# Patient Record
Sex: FEMALE | Race: WHITE | HISPANIC OR LATINO
[De-identification: names, ages, dates, MRNs, and addresses within clinical notes are randomized per-mention and may not be internally consistent; named-entity substitution may affect disease eponyms.]

---

## 2024-04-15 PROBLEM — Z00.00 ENCOUNTER FOR PREVENTIVE HEALTH EXAMINATION: Status: ACTIVE | Noted: 2024-04-15

## 2024-04-16 ENCOUNTER — NON-APPOINTMENT (OUTPATIENT)
Age: 79
End: 2024-04-16

## 2024-04-16 ENCOUNTER — APPOINTMENT (OUTPATIENT)
Dept: CARDIOLOGY | Facility: CLINIC | Age: 79
End: 2024-04-16
Payer: MEDICARE

## 2024-04-16 VITALS
HEART RATE: 70 BPM | DIASTOLIC BLOOD PRESSURE: 65 MMHG | TEMPERATURE: 96.8 F | BODY MASS INDEX: 36.7 KG/M2 | OXYGEN SATURATION: 96 % | WEIGHT: 215 LBS | HEIGHT: 64 IN | SYSTOLIC BLOOD PRESSURE: 110 MMHG | RESPIRATION RATE: 18 BRPM

## 2024-04-16 DIAGNOSIS — K21.9 GASTRO-ESOPHAGEAL REFLUX DISEASE W/OUT ESOPHAGITIS: ICD-10-CM

## 2024-04-16 DIAGNOSIS — Z86.39 PERSONAL HISTORY OF OTHER ENDOCRINE, NUTRITIONAL AND METABOLIC DISEASE: ICD-10-CM

## 2024-04-16 DIAGNOSIS — Z78.9 OTHER SPECIFIED HEALTH STATUS: ICD-10-CM

## 2024-04-16 PROCEDURE — 93000 ELECTROCARDIOGRAM COMPLETE: CPT

## 2024-04-16 PROCEDURE — 99204 OFFICE O/P NEW MOD 45 MIN: CPT

## 2024-04-16 RX ORDER — FUROSEMIDE 40 MG/1
40 TABLET ORAL
Refills: 0 | Status: ACTIVE | COMMUNITY

## 2024-04-16 RX ORDER — CARVEDILOL 3.12 MG/1
3.12 TABLET, FILM COATED ORAL
Refills: 0 | Status: ACTIVE | COMMUNITY

## 2024-04-16 RX ORDER — ROSUVASTATIN CALCIUM 10 MG/1
10 TABLET, FILM COATED ORAL
Refills: 0 | Status: ACTIVE | COMMUNITY

## 2024-04-16 RX ORDER — CITALOPRAM HYDROBROMIDE 20 MG/1
20 TABLET, FILM COATED ORAL
Refills: 0 | Status: ACTIVE | COMMUNITY

## 2024-04-16 RX ORDER — DEXLANSOPRAZOLE 60 MG/1
60 CAPSULE, DELAYED RELEASE ORAL DAILY
Refills: 0 | Status: ACTIVE | COMMUNITY

## 2024-04-16 RX ORDER — CHROMIUM 200 MCG
TABLET ORAL
Refills: 0 | Status: ACTIVE | COMMUNITY

## 2024-04-16 RX ORDER — ASPIRIN 81 MG
81 TABLET, DELAYED RELEASE (ENTERIC COATED) ORAL
Refills: 0 | Status: ACTIVE | COMMUNITY

## 2024-04-16 NOTE — HISTORY OF PRESENT ILLNESS
[FreeTextEntry1] : 77 yo female with VHD is here for a second opinion was diagnosed with AS/AI coupe of years ago, incidental finding on echo as per pt no major cvs symptoms et is stable but has been limited due to covid isolation and arthritis with bl knee pain h/o atrial ectopy responded well to bb ros is otherwise as below

## 2024-04-16 NOTE — REASON FOR VISIT
[Symptom and Test Evaluation] : symptom and test evaluation [CV Risk Factors and Non-Cardiac Disease] : CV risk factors and non-cardiac disease [Arrhythmia/ECG Abnorrmalities] : arrhythmia/ECG abnormalities [Structural Heart and Valve Disease] : structural heart and valve disease [Hyperlipidemia] : hyperlipidemia

## 2024-04-16 NOTE — ASSESSMENT
[FreeTextEntry1] : 79 yo female with pmhx and presentation as above VHD all prior studies reviewed will set up R&L heart cath to assess the severity of AS Patient is minimally symptomatic though but as per family members has slowed down significantly cont all meds rtc after cath

## 2024-04-16 NOTE — PHYSICAL EXAM
[No Acute Distress] : no acute distress [Normal Venous Pressure] : normal venous pressure [No Carotid Bruit] : no carotid bruit [Normal S1, S2] : normal S1, S2 [No Rub] : no rub [No Gallop] : no gallop [Clear Lung Fields] : clear lung fields [Good Air Entry] : good air entry [No Respiratory Distress] : no respiratory distress  [Soft] : abdomen soft [Non Tender] : non-tender [No Masses/organomegaly] : no masses/organomegaly [Normal Bowel Sounds] : normal bowel sounds [Normal Gait] : normal gait [No Edema] : no edema [No Cyanosis] : no cyanosis [No Clubbing] : no clubbing [No Varicosities] : no varicosities [No Rash] : no rash [No Skin Lesions] : no skin lesions [Moves all extremities] : moves all extremities [No Focal Deficits] : no focal deficits [Normal Speech] : normal speech [Alert and Oriented] : alert and oriented [Normal memory] : normal memory [de-identified] : 2/6 syst M

## 2024-05-09 ENCOUNTER — OUTPATIENT (OUTPATIENT)
Dept: OUTPATIENT SERVICES | Facility: HOSPITAL | Age: 79
LOS: 1 days | Discharge: HOME | End: 2024-05-09
Payer: MEDICARE

## 2024-05-09 ENCOUNTER — TRANSCRIPTION ENCOUNTER (OUTPATIENT)
Age: 79
End: 2024-05-09

## 2024-05-09 VITALS
HEIGHT: 63 IN | DIASTOLIC BLOOD PRESSURE: 45 MMHG | SYSTOLIC BLOOD PRESSURE: 125 MMHG | RESPIRATION RATE: 16 BRPM | HEART RATE: 67 BPM | WEIGHT: 197.98 LBS | OXYGEN SATURATION: 98 %

## 2024-05-09 VITALS
RESPIRATION RATE: 16 BRPM | SYSTOLIC BLOOD PRESSURE: 120 MMHG | DIASTOLIC BLOOD PRESSURE: 46 MMHG | HEART RATE: 68 BPM | OXYGEN SATURATION: 99 %

## 2024-05-09 DIAGNOSIS — R94.31 ABNORMAL ELECTROCARDIOGRAM [ECG] [EKG]: ICD-10-CM

## 2024-05-09 DIAGNOSIS — R06.00 DYSPNEA, UNSPECIFIED: ICD-10-CM

## 2024-05-09 LAB
ANION GAP SERPL CALC-SCNC: 9 MMOL/L — SIGNIFICANT CHANGE UP (ref 7–14)
BUN SERPL-MCNC: 24 MG/DL — HIGH (ref 10–20)
CALCIUM SERPL-MCNC: 9.5 MG/DL — SIGNIFICANT CHANGE UP (ref 8.4–10.5)
CHLORIDE SERPL-SCNC: 104 MMOL/L — SIGNIFICANT CHANGE UP (ref 98–110)
CO2 SERPL-SCNC: 26 MMOL/L — SIGNIFICANT CHANGE UP (ref 17–32)
CREAT SERPL-MCNC: 1 MG/DL — SIGNIFICANT CHANGE UP (ref 0.7–1.5)
EGFR: 58 ML/MIN/1.73M2 — LOW
GLUCOSE SERPL-MCNC: 94 MG/DL — SIGNIFICANT CHANGE UP (ref 70–99)
HCT VFR BLD CALC: 37.5 % — SIGNIFICANT CHANGE UP (ref 37–47)
HGB BLD-MCNC: 12.4 G/DL — SIGNIFICANT CHANGE UP (ref 12–16)
MCHC RBC-ENTMCNC: 31.1 PG — HIGH (ref 27–31)
MCHC RBC-ENTMCNC: 33.1 G/DL — SIGNIFICANT CHANGE UP (ref 32–37)
MCV RBC AUTO: 94 FL — SIGNIFICANT CHANGE UP (ref 81–99)
NRBC # BLD: 0 /100 WBCS — SIGNIFICANT CHANGE UP (ref 0–0)
PLATELET # BLD AUTO: 209 K/UL — SIGNIFICANT CHANGE UP (ref 130–400)
PMV BLD: 9.6 FL — SIGNIFICANT CHANGE UP (ref 7.4–10.4)
POTASSIUM SERPL-MCNC: 5 MMOL/L — SIGNIFICANT CHANGE UP (ref 3.5–5)
POTASSIUM SERPL-SCNC: 5 MMOL/L — SIGNIFICANT CHANGE UP (ref 3.5–5)
RBC # BLD: 3.99 M/UL — LOW (ref 4.2–5.4)
RBC # FLD: 14.3 % — SIGNIFICANT CHANGE UP (ref 11.5–14.5)
SODIUM SERPL-SCNC: 139 MMOL/L — SIGNIFICANT CHANGE UP (ref 135–146)
WBC # BLD: 6.51 K/UL — SIGNIFICANT CHANGE UP (ref 4.8–10.8)
WBC # FLD AUTO: 6.51 K/UL — SIGNIFICANT CHANGE UP (ref 4.8–10.8)

## 2024-05-09 PROCEDURE — 36415 COLL VENOUS BLD VENIPUNCTURE: CPT

## 2024-05-09 PROCEDURE — C1887: CPT

## 2024-05-09 PROCEDURE — 93460 R&L HRT ART/VENTRICLE ANGIO: CPT

## 2024-05-09 PROCEDURE — C1889: CPT

## 2024-05-09 PROCEDURE — 80048 BASIC METABOLIC PNL TOTAL CA: CPT

## 2024-05-09 PROCEDURE — C1769: CPT

## 2024-05-09 PROCEDURE — 93460 R&L HRT ART/VENTRICLE ANGIO: CPT | Mod: 26

## 2024-05-09 PROCEDURE — 85027 COMPLETE CBC AUTOMATED: CPT

## 2024-05-09 PROCEDURE — C1894: CPT

## 2024-05-09 RX ORDER — FUROSEMIDE 40 MG
1 TABLET ORAL
Refills: 0 | DISCHARGE

## 2024-05-09 RX ORDER — SODIUM CHLORIDE 9 MG/ML
400 INJECTION INTRAMUSCULAR; INTRAVENOUS; SUBCUTANEOUS
Refills: 0 | Status: DISCONTINUED | OUTPATIENT
Start: 2024-05-09 | End: 2024-05-09

## 2024-05-09 RX ORDER — ASPIRIN/CALCIUM CARB/MAGNESIUM 324 MG
324 TABLET ORAL ONCE
Refills: 0 | Status: DISCONTINUED | OUTPATIENT
Start: 2024-05-09 | End: 2024-05-09

## 2024-05-09 RX ORDER — DEXLANSOPRAZOLE 30 MG/1
1 CAPSULE, DELAYED RELEASE ORAL
Refills: 0 | DISCHARGE

## 2024-05-09 RX ORDER — CITALOPRAM 10 MG/1
1 TABLET, FILM COATED ORAL
Refills: 0 | DISCHARGE

## 2024-05-09 RX ORDER — CARVEDILOL PHOSPHATE 80 MG/1
1 CAPSULE, EXTENDED RELEASE ORAL
Refills: 0 | DISCHARGE

## 2024-05-09 RX ORDER — ASPIRIN/CALCIUM CARB/MAGNESIUM 324 MG
324 TABLET ORAL ONCE
Refills: 0 | Status: COMPLETED | OUTPATIENT
Start: 2024-05-09 | End: 2024-05-09

## 2024-05-09 RX ORDER — ROSUVASTATIN CALCIUM 5 MG/1
1 TABLET ORAL
Refills: 0 | DISCHARGE

## 2024-05-09 RX ORDER — ASPIRIN/CALCIUM CARB/MAGNESIUM 324 MG
0 TABLET ORAL
Refills: 0 | DISCHARGE

## 2024-05-09 RX ORDER — SODIUM CHLORIDE 9 MG/ML
1000 INJECTION INTRAMUSCULAR; INTRAVENOUS; SUBCUTANEOUS
Refills: 0 | Status: DISCONTINUED | OUTPATIENT
Start: 2024-05-09 | End: 2024-05-09

## 2024-05-09 RX ORDER — AMLODIPINE BESYLATE 2.5 MG/1
2.5 TABLET ORAL ONCE
Refills: 0 | Status: COMPLETED | OUTPATIENT
Start: 2024-05-09 | End: 2024-05-09

## 2024-05-09 RX ADMIN — Medication 324 MILLIGRAM(S): at 10:59

## 2024-05-09 RX ADMIN — SODIUM CHLORIDE 100 MILLILITER(S): 9 INJECTION INTRAMUSCULAR; INTRAVENOUS; SUBCUTANEOUS at 14:07

## 2024-05-09 RX ADMIN — AMLODIPINE BESYLATE 2.5 MILLIGRAM(S): 2.5 TABLET ORAL at 10:59

## 2024-05-09 RX ADMIN — SODIUM CHLORIDE 100 MILLILITER(S): 9 INJECTION INTRAMUSCULAR; INTRAVENOUS; SUBCUTANEOUS at 11:00

## 2024-05-09 NOTE — ASU DISCHARGE PLAN (ADULT/PEDIATRIC) - ASU DC SPECIAL INSTRUCTIONSFT
Activity:  - Do not drive or operate heavy machinery for 24 hours.  - Limit your physical or any strenuous activity for 2 weeks after angioplasty and 48 hours for angiogram. Support the groin site with your hand when you sneeze or cough. No heavy lifting ( objects more then 10 pounds).  - For wrist access, avoid using affected arm for 24 hours after removal of dressing and avoid heavy lifting for 7 days.  Hygiene:  - After 24 hours, you may shower and remove the dressing from the site. Do not tub bathe for one week. Do not rub or apply lotion, cream, powder to the affected site. Leave it open to air.   Diet:   - You may resume your diet. Low Sodium. Low Fat, Low Cholesterol.  If Diabetic - Carbohydrate Consistent Diet.      - Drink extra fluid unless otherwise advised.   Special Instructions:  - Bruising or black and blue at the puncture site is possible.  - If there is bleeding from the puncture site (groin or wrist) apply direct firm pressure on the site and call 911.  - Any sudden swelling, redness, fever, discharge or severe pain, call your physician or call the cath lab.   - If you notice any scab formation in the area avoid touching the site and allow it to heal.  - Numbness or "pins and needle" sensation in the affected arm, hand, leg or if the affected site become cool to touch or pale that persist for extended period of time call your physician immediately to be checked.  - If you developed chest pain, not relieved by your usual routine medication, fainting, lethargy, weakness, report to the nearest emergency room.   - Inform your Dentist or Surgeon if you are taking Aspirin or any antiplatelet medications. Report any bleeding in your urine or stool.   Medications:  - Soreness or tenderness at the site is possible it will diminish over time. You may take Tylenol every 4-6 hours as needed. Nothing stronger is needed.  - If you are diabetic and taking medication containing Metformin, do not take them for 48 hours after the procedure.     Any questions call Cardiac Cath Lab at [phone] or [phone], Monday - Friday from 7 - 9 pm.

## 2024-05-09 NOTE — ASU DISCHARGE PLAN (ADULT/PEDIATRIC) - CARE PROVIDER_API CALL
Andrea Elkins  Interventional Cardiology  [address] [place] [address], [address]  [place] [address]  Phone: [phone]  Fax: [phone]  Follow Up Time: 2 weeks

## 2024-05-09 NOTE — ASU DISCHARGE PLAN (ADULT/PEDIATRIC) - NS MD DC FALL RISK RISK
For information on Fall & Injury Prevention, visit: [email or web address] OR  [email or web address] OR  https://www.cdc.gov/steadi/patient.html

## 2024-05-09 NOTE — H&P CARDIOLOGY - HISTORY OF PRESENT ILLNESS
78 y/old F here for [facility] due to DEXTER and palpitations   PMH: AS/AI, DEXTER, Palpitations  PSH: none  FH: none    Pre cath note:    indication:  [ ] STEMI                [ ] NSTEMI                 [ ] Acute coronary syndrome                         [ ]Unstable Angina   [ ] high risk  [ ] intermediate risk  [ ] low risk                         [ x]  DEXTER   non-invasive testing:  Echo                         Date:                     result: [ ] high risk  [x ] intermediate risk  [ ] low risk    Anti- Anginal medications:                        [ ] not used                       [x ] used:  [x ]CCB  [x ] BB  [ ] Nitrate [ ] Ranexa                [ ] not used but strong indication not to use    Ejection Fraction                       [ ] <29            [ ] 30-39%   [ ] 40-49%     [x ]>50%    CHF                       [ ] active (within last 14 days on meds   [ ] Chronic (on meds but no exacerbation)    COPD                        [ ] mild (on chronic bronchodilators)  [ ] moderate (on chronic steroid therapy)      [ ] severe (indication for home O2 or PACO2 >50)    Other risk factors:                         [ ] Previous MI                       [ ] CVA/ stroke                      [ ] carotid stent/ CEA                      [ ] PVD/PAD- (arterial aneurysm, non-palpable pulses, tortuous vessel with inability to insert catheter, infra-renal dissection, renal or subclavian artery stenosis)                      [ ] diabetic                      [ ] previous CABG                      [ ] Renal Failure       Adjusted CathPCI Bleeding Event Risk: 1.2%    RIGHT RADIAL ARTERY EVALUATION:  ELVA TEST: [ ] Negative          [x ] Positive    IVF: 100 cc/hr NS bolus pre-cath hydration [x ]

## 2024-05-09 NOTE — CHART NOTE - NSCHARTNOTEFT_GEN_A_CORE
PRE-OP DIAGNOSIS:    AI/AS    PROCEDURE:     [x] Coronary Angiogram     [x] LHC     [] LVG     [x] RHC     [] Intervention (see below)         PHYSICIAN:  Dr. Elkins    ASSISTANT:  Dr. Nava       PROCEDURE DESCRIPTION:     Consent:      [x] Patient     [] Family Member     []  Used        Anesthesia:     [] General     [x] Sedation     [x] Local        Access & Closure:     [x] 6 Fr Right Radial Artery, D stat     [] Fr Femoral Artery     [x] 6 Fr Femoral Vein     [] Fr Brachial Vein       IV Contrast: 40mL     FINDINGS:     Coronary Dominance: codominant    LM: no disease    LAD: luminal irregularities    CX:  luminal irregularities    RCA:  luminal irregularities    FERNANDO CO 6.02 (2.65)    ESTIMATED BLOOD LOSS: < 10 mL        CONDITION:     [x] Good     [] Fair     [] Critical        SPECIMEN REMOVED: N/A       POST-OP DIAGNOSIS:      [] Normal Coronary Angiogram     [x] Mild Coronary Artery Disease (< 50% stenosis)     [] __ Vessel Coronary Artery Disease        PLAN OF CARE:     [x] D/C Home Today     [x] Optimize medical therapy    [x] f/u with cardiologist PRE-OP DIAGNOSIS:    AI/AS    PROCEDURE:     [x] Coronary Angiogram     [x] LHC     [] LVG     [x] RHC     [] Intervention (see below)         PHYSICIAN:  Dr. Elkins    ASSISTANT:  Dr. Nava       PROCEDURE DESCRIPTION:     Consent:      [x] Patient     [] Family Member     []  Used        Anesthesia:     [] General     [x] Sedation     [x] Local        Access & Closure:     [x] 6 Fr Right Radial Artery, D stat     [] Fr Femoral Artery     [x] 6 Fr Femoral Vein     [] Fr Brachial Vein       IV Contrast: 40mL     FINDINGS:     Coronary Dominance: codominant    LM: no disease    LAD: luminal irregularities    CX:  luminal irregularities    RCA:  luminal irregularities    FERNANDO CO 6.02 (2.65)    RV 32/11  PA 31/16  PCW 18  RA 12/9/8    AV 0.9cm2    ESTIMATED BLOOD LOSS: < 10 mL        CONDITION:     [x] Good     [] Fair     [] Critical        SPECIMEN REMOVED: N/A       POST-OP DIAGNOSIS:      [] Normal Coronary Angiogram     [x] Mild Coronary Artery Disease (< 50% stenosis)     [] __ Vessel Coronary Artery Disease        PLAN OF CARE:     [x] D/C Home Today     [x] Optimize medical therapy    [x] f/u with cardiologist PRE-OP DIAGNOSIS:    AI/AS    PROCEDURE:     [x] Coronary Angiogram     [x] LHC     [] LVG     [x] RHC     [] Intervention (see below)         PHYSICIAN:  Dr. Elkins    ASSISTANT:  Dr. Nava       PROCEDURE DESCRIPTION:     Consent:      [x] Patient     [] Family Member     []  Used        Anesthesia:     [] General     [x] Sedation     [x] Local        Access & Closure:     [x] 6 Fr Right Radial Artery, D stat     [] Fr Femoral Artery     [x] 6 Fr Femoral Vein     [] Fr Brachial Vein       IV Contrast: 40mL     FINDINGS:     Coronary Dominance: codominant    LM: no disease    LAD: luminal irregularities    CX:  luminal irregularities    RCA:  luminal irregularities    FERNANDO CO 6.02 (2.65)    RV 32/11  PA 31/16  PCW 18  RA 12/9/8    AV 0.9cm2    ESTIMATED BLOOD LOSS: < 10 mL        CONDITION:     [x] Good     [] Fair     [] Critical        SPECIMEN REMOVED: N/A       POST-OP DIAGNOSIS:      [] Normal Coronary Angiogram     [x] Mild Coronary Artery Disease (< 50% stenosis)     [x] Moderate to severe AS    [] __ Vessel Coronary Artery Disease        PLAN OF CARE:     [x] D/C Home Today     [x] Optimize medical therapy    [x] f/u with cardiologist PRE-OP DIAGNOSIS:    AI/AS    PROCEDURE:     [x] Coronary Angiogram     [x] LHC     [] LVG     [x] RHC     [] Intervention (see below)         PHYSICIAN:  Dr. Elkins    ASSISTANT:  Dr. Nava       PROCEDURE DESCRIPTION:     Consent:      [x] Patient     [] Family Member     []  Used        Anesthesia:     [] General     [x] Sedation     [x] Local        Access & Closure:     [x] 6 Fr Right Radial Artery, D stat     [] Fr Femoral Artery     [x] 6 Fr Femoral Vein     [] Fr Brachial Vein       IV Contrast: 40mL     FINDINGS:     Coronary Dominance: codominant    LM: no disease    LAD: luminal irregularities    CX:  luminal irregularities    RCA:  luminal irregularities    FERNANDO CO 6.02 (2.65)    RV 32/11  PA 31/16  PCW 18  RA 12/9/8    AV 0.9cm2    ESTIMATED BLOOD LOSS: < 10 mL        CONDITION:     [x] Good     [] Fair     [] Critical        SPECIMEN REMOVED: N/A       POST-OP DIAGNOSIS:      [] Normal Coronary Angiogram     [x] Mild Coronary Artery Disease (< 50% stenosis)     [x] Moderate to severe AS         PLAN OF CARE:     [x] D/C Home Today     [x] Optimize medical therapy    [x] f/u with cardiologist

## 2024-05-10 PROBLEM — E78.5 HYPERLIPIDEMIA, UNSPECIFIED: Chronic | Status: ACTIVE | Noted: 2024-05-09

## 2024-05-10 PROBLEM — I35.0 NONRHEUMATIC AORTIC (VALVE) STENOSIS: Chronic | Status: ACTIVE | Noted: 2024-05-09

## 2024-05-16 DIAGNOSIS — I10 ESSENTIAL (PRIMARY) HYPERTENSION: ICD-10-CM

## 2024-05-16 DIAGNOSIS — R94.31 ABNORMAL ELECTROCARDIOGRAM [ECG] [EKG]: ICD-10-CM

## 2024-05-16 DIAGNOSIS — E78.5 HYPERLIPIDEMIA, UNSPECIFIED: ICD-10-CM

## 2024-05-21 ENCOUNTER — APPOINTMENT (OUTPATIENT)
Dept: CARDIOLOGY | Facility: CLINIC | Age: 79
End: 2024-05-21
Payer: MEDICARE

## 2024-05-21 VITALS
HEIGHT: 64 IN | HEART RATE: 64 BPM | TEMPERATURE: 95.8 F | BODY MASS INDEX: 37.22 KG/M2 | RESPIRATION RATE: 18 BRPM | SYSTOLIC BLOOD PRESSURE: 115 MMHG | OXYGEN SATURATION: 97 % | WEIGHT: 218 LBS | DIASTOLIC BLOOD PRESSURE: 65 MMHG

## 2024-05-21 DIAGNOSIS — R94.31 ABNORMAL ELECTROCARDIOGRAM [ECG] [EKG]: ICD-10-CM

## 2024-05-21 DIAGNOSIS — R06.00 DYSPNEA, UNSPECIFIED: ICD-10-CM

## 2024-05-21 DIAGNOSIS — I35.9 NONRHEUMATIC AORTIC VALVE DISORDER, UNSPECIFIED: ICD-10-CM

## 2024-05-21 PROCEDURE — 93000 ELECTROCARDIOGRAM COMPLETE: CPT

## 2024-05-21 PROCEDURE — 99214 OFFICE O/P EST MOD 30 MIN: CPT

## 2024-05-21 NOTE — PHYSICAL EXAM
[No Acute Distress] : no acute distress [Normal Venous Pressure] : normal venous pressure [No Carotid Bruit] : no carotid bruit [Normal S1, S2] : normal S1, S2 [No Rub] : no rub [No Gallop] : no gallop [Clear Lung Fields] : clear lung fields [Good Air Entry] : good air entry [No Respiratory Distress] : no respiratory distress  [Soft] : abdomen soft [Non Tender] : non-tender [No Masses/organomegaly] : no masses/organomegaly [Normal Bowel Sounds] : normal bowel sounds [Normal Gait] : normal gait [No Edema] : no edema [No Cyanosis] : no cyanosis [No Clubbing] : no clubbing [No Varicosities] : no varicosities [No Rash] : no rash [No Skin Lesions] : no skin lesions [Moves all extremities] : moves all extremities [No Focal Deficits] : no focal deficits [Normal Speech] : normal speech [Alert and Oriented] : alert and oriented [Normal memory] : normal memory [de-identified] : 2/6 syst M

## 2024-05-21 NOTE — ASSESSMENT
[FreeTextEntry1] : 79 yo female with pmhx and presentation as above VHD- Mod AS R&L heart cath reviewed and d/w the pt Yearly echo, monitor for symptom progression cont all meds rtc 4-6 months

## 2024-05-21 NOTE — HISTORY OF PRESENT ILLNESS
[FreeTextEntry1] : 77 yo female with VHD is here for a f/up visit was diagnosed with AS/AI couple of years ago, incidental finding on echo as per pt no major cvs symptoms et is stable but has been limited due to covid isolation and arthritis with bl knee pain 5/24 s/p mr&l cath  - mod AS, no cad ros is otherwise as below

## 2024-09-19 NOTE — H&P CARDIOLOGY - EJECTION FRACTION (%)
Peripheral IV  Date/Time: 9/19/2024 9:52 AM  Inserted by: Miguelito Elam MD    Placement  Needle size: 22 G  Laterality: left  Location: hand  Site prep: alcohol         70

## 2024-11-19 ENCOUNTER — APPOINTMENT (OUTPATIENT)
Dept: CARDIOLOGY | Facility: CLINIC | Age: 79
End: 2024-11-19
Payer: MEDICARE

## 2024-11-19 ENCOUNTER — NON-APPOINTMENT (OUTPATIENT)
Age: 79
End: 2024-11-19

## 2024-11-19 VITALS
HEART RATE: 71 BPM | RESPIRATION RATE: 18 BRPM | SYSTOLIC BLOOD PRESSURE: 115 MMHG | BODY MASS INDEX: 38.07 KG/M2 | OXYGEN SATURATION: 92 % | DIASTOLIC BLOOD PRESSURE: 65 MMHG | WEIGHT: 223 LBS | TEMPERATURE: 96 F | HEIGHT: 64 IN

## 2024-11-19 DIAGNOSIS — K21.9 GASTRO-ESOPHAGEAL REFLUX DISEASE W/OUT ESOPHAGITIS: ICD-10-CM

## 2024-11-19 DIAGNOSIS — I35.9 NONRHEUMATIC AORTIC VALVE DISORDER, UNSPECIFIED: ICD-10-CM

## 2024-11-19 DIAGNOSIS — R94.31 ABNORMAL ELECTROCARDIOGRAM [ECG] [EKG]: ICD-10-CM

## 2024-11-19 DIAGNOSIS — R06.00 DYSPNEA, UNSPECIFIED: ICD-10-CM

## 2024-11-19 PROCEDURE — 93000 ELECTROCARDIOGRAM COMPLETE: CPT

## 2024-11-19 PROCEDURE — 99214 OFFICE O/P EST MOD 30 MIN: CPT

## 2025-04-15 ENCOUNTER — APPOINTMENT (OUTPATIENT)
Dept: CARDIOLOGY | Facility: CLINIC | Age: 80
End: 2025-04-15
Payer: MEDICARE

## 2025-04-15 VITALS
RESPIRATION RATE: 16 BRPM | HEART RATE: 64 BPM | SYSTOLIC BLOOD PRESSURE: 128 MMHG | DIASTOLIC BLOOD PRESSURE: 80 MMHG | WEIGHT: 235 LBS | HEIGHT: 64 IN | BODY MASS INDEX: 40.12 KG/M2

## 2025-04-15 DIAGNOSIS — R06.00 DYSPNEA, UNSPECIFIED: ICD-10-CM

## 2025-04-15 DIAGNOSIS — R94.31 ABNORMAL ELECTROCARDIOGRAM [ECG] [EKG]: ICD-10-CM

## 2025-04-15 DIAGNOSIS — I35.9 NONRHEUMATIC AORTIC VALVE DISORDER, UNSPECIFIED: ICD-10-CM

## 2025-04-15 PROCEDURE — 99213 OFFICE O/P EST LOW 20 MIN: CPT | Mod: 25

## 2025-04-15 PROCEDURE — 93000 ELECTROCARDIOGRAM COMPLETE: CPT

## 2025-04-15 PROCEDURE — 93306 TTE W/DOPPLER COMPLETE: CPT

## 2025-07-15 ENCOUNTER — APPOINTMENT (OUTPATIENT)
Dept: CARDIOLOGY | Facility: CLINIC | Age: 80
End: 2025-07-15
Payer: MEDICARE

## 2025-07-15 ENCOUNTER — NON-APPOINTMENT (OUTPATIENT)
Age: 80
End: 2025-07-15

## 2025-07-15 VITALS
BODY MASS INDEX: 40.46 KG/M2 | WEIGHT: 237 LBS | DIASTOLIC BLOOD PRESSURE: 60 MMHG | RESPIRATION RATE: 16 BRPM | OXYGEN SATURATION: 94 % | HEIGHT: 64 IN | SYSTOLIC BLOOD PRESSURE: 95 MMHG | HEART RATE: 80 BPM

## 2025-07-15 PROCEDURE — 93000 ELECTROCARDIOGRAM COMPLETE: CPT

## 2025-07-15 PROCEDURE — 99214 OFFICE O/P EST MOD 30 MIN: CPT

## 2025-07-15 RX ORDER — DULAGLUTIDE 0.75 MG/.5ML
0.75 INJECTION, SOLUTION SUBCUTANEOUS
Refills: 0 | Status: ACTIVE | COMMUNITY

## 2025-07-15 RX ORDER — DEXTROMETHORPHAN HYDROBROMIDE, BUPROPION HYDROCHLORIDE 105; 45 MG/1; MG/1
45-105 TABLET, MULTILAYER, EXTENDED RELEASE ORAL
Refills: 0 | Status: ACTIVE | COMMUNITY